# Patient Record
Sex: FEMALE | Race: WHITE | NOT HISPANIC OR LATINO | URBAN - METROPOLITAN AREA
[De-identification: names, ages, dates, MRNs, and addresses within clinical notes are randomized per-mention and may not be internally consistent; named-entity substitution may affect disease eponyms.]

---

## 2021-03-15 ENCOUNTER — APPOINTMENT (OUTPATIENT)
Dept: RADIOLOGY | Facility: MEDICAL CENTER | Age: 61
End: 2021-03-15
Attending: EMERGENCY MEDICINE
Payer: COMMERCIAL

## 2021-03-15 ENCOUNTER — HOSPITAL ENCOUNTER (EMERGENCY)
Facility: MEDICAL CENTER | Age: 61
End: 2021-03-15
Attending: EMERGENCY MEDICINE
Payer: COMMERCIAL

## 2021-03-15 VITALS
HEART RATE: 59 BPM | SYSTOLIC BLOOD PRESSURE: 133 MMHG | RESPIRATION RATE: 16 BRPM | OXYGEN SATURATION: 96 % | TEMPERATURE: 96.6 F | DIASTOLIC BLOOD PRESSURE: 71 MMHG | WEIGHT: 143.74 LBS | BODY MASS INDEX: 26.45 KG/M2 | HEIGHT: 62 IN

## 2021-03-15 DIAGNOSIS — R51.9 ACUTE NONINTRACTABLE HEADACHE, UNSPECIFIED HEADACHE TYPE: ICD-10-CM

## 2021-03-15 LAB
ALBUMIN SERPL BCP-MCNC: 4.6 G/DL (ref 3.2–4.9)
ALBUMIN/GLOB SERPL: 1.5 G/DL
ALP SERPL-CCNC: 94 U/L (ref 30–99)
ALT SERPL-CCNC: 16 U/L (ref 2–50)
ANION GAP SERPL CALC-SCNC: 11 MMOL/L (ref 7–16)
APTT PPP: 29.3 SEC (ref 24.7–36)
AST SERPL-CCNC: 12 U/L (ref 12–45)
BASOPHILS # BLD AUTO: 0.6 % (ref 0–1.8)
BASOPHILS # BLD: 0.03 K/UL (ref 0–0.12)
BILIRUB SERPL-MCNC: 0.5 MG/DL (ref 0.1–1.5)
BUN SERPL-MCNC: 18 MG/DL (ref 8–22)
CALCIUM SERPL-MCNC: 10.6 MG/DL (ref 8.5–10.5)
CHLORIDE SERPL-SCNC: 101 MMOL/L (ref 96–112)
CO2 SERPL-SCNC: 23 MMOL/L (ref 20–33)
CREAT SERPL-MCNC: 0.95 MG/DL (ref 0.5–1.4)
CRP SERPL HS-MCNC: 0.29 MG/DL (ref 0–0.75)
EOSINOPHIL # BLD AUTO: 0.16 K/UL (ref 0–0.51)
EOSINOPHIL NFR BLD: 3.1 % (ref 0–6.9)
ERYTHROCYTE [DISTWIDTH] IN BLOOD BY AUTOMATED COUNT: 50 FL (ref 35.9–50)
ERYTHROCYTE [SEDIMENTATION RATE] IN BLOOD BY WESTERGREN METHOD: 20 MM/HOUR (ref 0–30)
GLOBULIN SER CALC-MCNC: 3.1 G/DL (ref 1.9–3.5)
GLUCOSE SERPL-MCNC: 96 MG/DL (ref 65–99)
HCT VFR BLD AUTO: 42 % (ref 37–47)
HGB BLD-MCNC: 13.6 G/DL (ref 12–16)
IMM GRANULOCYTES # BLD AUTO: 0.02 K/UL (ref 0–0.11)
IMM GRANULOCYTES NFR BLD AUTO: 0.4 % (ref 0–0.9)
INR PPP: 0.88 (ref 0.87–1.13)
LYMPHOCYTES # BLD AUTO: 1.92 K/UL (ref 1–4.8)
LYMPHOCYTES NFR BLD: 36.9 % (ref 22–41)
MCH RBC QN AUTO: 31.9 PG (ref 27–33)
MCHC RBC AUTO-ENTMCNC: 32.4 G/DL (ref 33.6–35)
MCV RBC AUTO: 98.6 FL (ref 81.4–97.8)
MONOCYTES # BLD AUTO: 0.37 K/UL (ref 0–0.85)
MONOCYTES NFR BLD AUTO: 7.1 % (ref 0–13.4)
NEUTROPHILS # BLD AUTO: 2.7 K/UL (ref 2–7.15)
NEUTROPHILS NFR BLD: 51.9 % (ref 44–72)
NRBC # BLD AUTO: 0 K/UL
NRBC BLD-RTO: 0 /100 WBC
PLATELET # BLD AUTO: 201 K/UL (ref 164–446)
PMV BLD AUTO: 10.1 FL (ref 9–12.9)
POTASSIUM SERPL-SCNC: 3.9 MMOL/L (ref 3.6–5.5)
PROT SERPL-MCNC: 7.7 G/DL (ref 6–8.2)
PROTHROMBIN TIME: 12.3 SEC (ref 12–14.6)
RBC # BLD AUTO: 4.26 M/UL (ref 4.2–5.4)
SODIUM SERPL-SCNC: 135 MMOL/L (ref 135–145)
T4 FREE SERPL-MCNC: 1.29 NG/DL (ref 0.93–1.7)
TSH SERPL DL<=0.005 MIU/L-ACNC: 1.02 UIU/ML (ref 0.38–5.33)
WBC # BLD AUTO: 5.2 K/UL (ref 4.8–10.8)

## 2021-03-15 PROCEDURE — 99285 EMERGENCY DEPT VISIT HI MDM: CPT

## 2021-03-15 PROCEDURE — 36415 COLL VENOUS BLD VENIPUNCTURE: CPT

## 2021-03-15 PROCEDURE — 700105 HCHG RX REV CODE 258: Performed by: EMERGENCY MEDICINE

## 2021-03-15 PROCEDURE — 700117 HCHG RX CONTRAST REV CODE 255: Performed by: EMERGENCY MEDICINE

## 2021-03-15 PROCEDURE — 700111 HCHG RX REV CODE 636 W/ 250 OVERRIDE (IP): Performed by: EMERGENCY MEDICINE

## 2021-03-15 PROCEDURE — 86140 C-REACTIVE PROTEIN: CPT

## 2021-03-15 PROCEDURE — 80053 COMPREHEN METABOLIC PANEL: CPT

## 2021-03-15 PROCEDURE — 70498 CT ANGIOGRAPHY NECK: CPT

## 2021-03-15 PROCEDURE — 85025 COMPLETE CBC W/AUTO DIFF WBC: CPT

## 2021-03-15 PROCEDURE — 85730 THROMBOPLASTIN TIME PARTIAL: CPT

## 2021-03-15 PROCEDURE — 84443 ASSAY THYROID STIM HORMONE: CPT

## 2021-03-15 PROCEDURE — 70496 CT ANGIOGRAPHY HEAD: CPT

## 2021-03-15 PROCEDURE — 85652 RBC SED RATE AUTOMATED: CPT

## 2021-03-15 PROCEDURE — 85610 PROTHROMBIN TIME: CPT

## 2021-03-15 PROCEDURE — 84439 ASSAY OF FREE THYROXINE: CPT

## 2021-03-15 RX ORDER — SODIUM CHLORIDE, SODIUM LACTATE, POTASSIUM CHLORIDE, CALCIUM CHLORIDE 600; 310; 30; 20 MG/100ML; MG/100ML; MG/100ML; MG/100ML
1000 INJECTION, SOLUTION INTRAVENOUS ONCE
Status: COMPLETED | OUTPATIENT
Start: 2021-03-15 | End: 2021-03-15

## 2021-03-15 RX ORDER — LEVOTHYROXINE SODIUM 88 UG/1
88 TABLET ORAL
COMMUNITY

## 2021-03-15 RX ORDER — DIPHENHYDRAMINE HYDROCHLORIDE 50 MG/ML
12.5 INJECTION INTRAMUSCULAR; INTRAVENOUS ONCE
Status: COMPLETED | OUTPATIENT
Start: 2021-03-15 | End: 2021-03-15

## 2021-03-15 RX ORDER — METOCLOPRAMIDE HYDROCHLORIDE 5 MG/ML
10 INJECTION INTRAMUSCULAR; INTRAVENOUS ONCE
Status: COMPLETED | OUTPATIENT
Start: 2021-03-15 | End: 2021-03-15

## 2021-03-15 RX ADMIN — DIPHENHYDRAMINE HYDROCHLORIDE 12.5 MG: 50 INJECTION INTRAMUSCULAR; INTRAVENOUS at 12:54

## 2021-03-15 RX ADMIN — METOCLOPRAMIDE 10 MG: 5 INJECTION, SOLUTION INTRAMUSCULAR; INTRAVENOUS at 12:54

## 2021-03-15 RX ADMIN — SODIUM CHLORIDE, POTASSIUM CHLORIDE, SODIUM LACTATE AND CALCIUM CHLORIDE 1000 ML: 600; 310; 30; 20 INJECTION, SOLUTION INTRAVENOUS at 12:54

## 2021-03-15 RX ADMIN — IOHEXOL 80 ML: 350 INJECTION, SOLUTION INTRAVENOUS at 11:34

## 2021-03-15 ASSESSMENT — LIFESTYLE VARIABLES: DO YOU DRINK ALCOHOL: NO

## 2021-03-15 NOTE — ED NOTES
Visual acuity test performed, pt wearing Rx correctional lenses during test.      OS (Left Eye): 20/35   OD (Right Eye): 20/30    OU (Both Eyes): 20/25

## 2021-03-15 NOTE — ED TRIAGE NOTES
"61 y/o female ambulatory to triage with c/o headache \"sharp, knocking pain\" intermittently to the right side of the head along with visual changes.  Pt states she has several aneurysm clips and coils r/t three aneurysms that occurred 8 years ago.   "

## 2021-03-15 NOTE — ED PROVIDER NOTES
ED Provider Note    CHIEF COMPLAINT  Chief Complaint   Patient presents with   • Headache   • Visual Problems       HPI  Irasema Namita Jean-Baptiste is a 60 y.o. female who presents right-sided headache starting this morning at around 6 AM.  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  No associated vomiting.  Has had associated faint blurry vision to the right eye over the past week or so which has been intermittent.  The patient has extensive intracranial history including aneurysm status post coiling about 9 years ago and clipping 3 years ago at [facility].  Denies any numbness or weakness.  Has baseline poor balance.  She states that she typically does not get headaches despite her aneurysm history.  No known history of migraines.  She states that the pain is maximal in the right temporal region.  No jaw pain or claudication.    REVIEW OF SYSTEMS  See HPI for further details. All other systems are negative.     PAST MEDICAL HISTORY   has a past medical history of Aneurysm (HCC), Cancer (HCC) (), Graves' disease with exophthalmos, and Indigestion.    SOCIAL HISTORY  Social History     Tobacco Use   • Smoking status: Former Smoker     Quit date: 3/15/2013     Years since quittin.0   • Smokeless tobacco: Never Used   • Tobacco comment: 1/2 pack day for 30 yrs   Substance and Sexual Activity   • Alcohol use: Yes     Comment: occassional   • Drug use: Yes     Comment: smokes marijuana occ   • Sexual activity: Not on file       SURGICAL HISTORY   has a past surgical history that includes partial hemispherectomy (); craniotomy aneurysm; and other neurological surg.    CURRENT MEDICATIONS  Home Medications     Reviewed by Enrique Uribe R.N. (Registered Nurse) on 03/15/21 at 0855  Med List Status: Partial   Medication Last Dose Status   aspirin EC (ECOTRIN) 81 MG TBEC not taking Active   atorvastatin (LIPITOR) 20 MG TABS not taking Active   carvedilol (COREG) 6.25 MG TABS not taking Active   IBUPROFEN not  "taking Active   levothyroxine (SYNTHROID) 88 MCG Tab  Active   oxycodone-acetaminophen (PERCOCET) 5-325 MG TABS not taking Active                ALLERGIES  Allergies   Allergen Reactions   • Penicillins Shortness of Breath       PHYSICAL EXAM  VITAL SIGNS: /89   Pulse 89   Temp 36.1 °C (97 °F) (Temporal)   Resp 14   Ht 1.575 m (5' 2\")   Wt 65.2 kg (143 lb 11.8 oz)   SpO2 92%   BMI 26.29 kg/m²   Pulse ox interpretation: I interpret this pulse ox as normal.  Constitutional: Alert in no apparent distress.  HENT: No signs of trauma, Bilateral external ears normal, Nose normal.   Eyes: Pupils are equal and reactive, Conjunctiva normal, Non-icteric.   Neck: Normal range of motion, No tenderness, Supple, No stridor.   Cardiovascular: Regular rate and rhythm.   Thorax & Lungs: Normal breath sounds, No respiratory distress, No wheezing, No chest tenderness.   Abdomen: Bowel sounds normal, Soft, No tenderness, No masses, No pulsatile masses. No peritoneal signs.  Skin: Warm, Dry, No erythema, No rash.   Back: No bony tenderness, No CVA tenderness.   Extremities: Intact distal pulses, No edema, No tenderness, No cyanosis  Musculoskeletal: Good range of motion in all major joints. No tenderness to palpation or major deformities noted.   Neurologic: Alert, cranial nerves II through XII grossly intact, normal motor function and gait, Normal sensory function, No focal deficits noted.       DIAGNOSTIC STUDIES / PROCEDURES    EKG - Physician interpretation  No results found for this or any previous visit.      LABS  Labs Reviewed   CBC WITH DIFFERENTIAL - Abnormal; Notable for the following components:       Result Value    MCV 98.6 (*)     MCHC 32.4 (*)     All other components within normal limits    Narrative:     Indicate which anticoagulants the patient is on:->NONE   COMP METABOLIC PANEL - Abnormal; Notable for the following components:    Calcium 10.6 (*)     All other components within normal limits    " Narrative:     Indicate which anticoagulants the patient is on:->NONE   PROTHROMBIN TIME    Narrative:     Indicate which anticoagulants the patient is on:->NONE   APTT    Narrative:     Indicate which anticoagulants the patient is on:->NONE   SED RATE    Narrative:     Indicate which anticoagulants the patient is on:->NONE   CRP QUANTITIVE (NON-CARDIAC)    Narrative:     Indicate which anticoagulants the patient is on:->NONE   ESTIMATED GFR    Narrative:     Indicate which anticoagulants the patient is on:->NONE   TSH   FREE THYROXINE         RADIOLOGY  CT-CTA HEAD WITH & W/O-POST PROCESS   Final Result      1.  No acute intracranial abnormality.   2.  Bilateral aneurysm coiling/clipping as detailed above. No definite residual/recurrent aneurysm given limitation of artifact.   3.  No large vessel occlusion.      CT-CTA NECK WITH & W/O-POST PROCESSING   Final Result      1.  Metallic wire within the right internal carotid artery lumen from distal right ICA aneurysm coil mass.   2.  Mild atherosclerosis of the carotid bifurcations.   3.  No stenosis of the neck arteries by NASCET criteria.   4.  9 mm left upper lobe noncalcified pulmonary nodule. Follow-up/further evaluation as detailed.      Small pulmonary nodules are an extremely common finding on CT, and even in smokers, the vast majority of these nodules are benign.  For this reason, we recommend managing such nodules with follow-up CT.      Single solid nodules measuring greater than 8 mm in all patients, consider CT, PET/CT, or tissue sampling at three months.      Fleischner Society 2017 Guidelines for Management of Incidentally Detected Pulmonary Nodules on CT Images            COURSE & MEDICAL DECISION MAKING    Medications   iohexol (OMNIPAQUE) 350 mg/mL (80 mL Intravenous Given 3/15/21 1134)   lactated ringers infusion (BOLUS) (0 mL Intravenous Stopped 3/15/21 1406)   metoclopramide (REGLAN) injection 10 mg (10 mg Intravenous Given 3/15/21 1254)    diphenhydrAMINE (BENADRYL) injection 12.5 mg (12.5 mg Intravenous Given 3/15/21 1254)       Pertinent Labs & Imaging studies reviewed. (See chart for details)  60 y.o. female presenting with right-sided headache and intermittent blurry vision.  Has an extensive cerebral aneurysmal history including multiple brain aneurysms and coiling followed by clipping of vessels in the brain on the right side.  No vomiting.  No numbness or weakness or focal neurologic deficits except for the intermittent blurry vision but she has rather good eyesight here in the emergency department.  The patient does wear corrective lenses.  No skin changes or history of trauma.    CT of the head and neck with contrast was obtained within 6 hours of onset of headache.  Headache was not described as thunderclap/sudden on onset.  Has slight right-sided neck pain.  Given the unusual nature of the patient's headache for this patient along with extensive prior intracranial history, CT was ordered and found to be unremarkable for acute abnormalities.  Patient was treated for headache with typical migraine therapy including IV fluid hydration, Reglan, Benadryl.  Upon reevaluation, she reports feeling much improved and back to baseline.    HYDRATION: Based on the patient's presentation of headache the patient was given IV fluids for typical migraine therapy. IV Hydration was used because oral hydration was not as rapid as required. Upon recheck following hydration, the patient was improved.  The patient also had ESR and CRP performed that were unremarkable.  Low suspicion for temporal arteritis.  Laboratory studies are overall unremarkable and noncontributory to the patient's presenting symptoms.    All results were reviewed with patient and was recommended that she follow-up with a primary care physician for further management.  She may require follow-up with a local neurologist as well.    The patient was instructed to follow-up with primary care  "physician for further management.  To return immediately for any worsening symptoms or development of any other concerning signs or symptoms. The patient verbalizes understanding in their own words.    /71   Pulse (!) 59   Temp 35.9 °C (96.6 °F)   Resp 16   Ht 1.575 m (5' 2\")   Wt 65.2 kg (143 lb 11.8 oz)   SpO2 96%   BMI 26.29 kg/m²     The patient was referred to primary care where they will receive further BP management.      [facility], Emergency Dept  [address] [address]  [phone]    As needed, If symptoms worsen    [facility], Emergency Dept  [address] [address] [phone]        Primary care doctor    Schedule an appointment as soon as possible for a visit       [facility] - Neurology  [address], [address]  [facility] [address]  [phone]  Schedule an appointment as soon as possible for a visit         FINAL IMPRESSION  1. Acute nonintractable headache, unspecified headache type            Electronically signed by: Yariel Delgado M.D., 3/15/2021 9:32 AM    "

## 2025-04-29 ENCOUNTER — APPOINTMENT (OUTPATIENT)
Dept: RADIOLOGY | Facility: MEDICAL CENTER | Age: 65
End: 2025-04-29
Attending: EMERGENCY MEDICINE
Payer: MEDICARE

## 2025-04-29 ENCOUNTER — HOSPITAL ENCOUNTER (EMERGENCY)
Facility: MEDICAL CENTER | Age: 65
End: 2025-04-29
Attending: EMERGENCY MEDICINE
Payer: MEDICARE

## 2025-04-29 VITALS
BODY MASS INDEX: 26.68 KG/M2 | RESPIRATION RATE: 19 BRPM | OXYGEN SATURATION: 90 % | HEART RATE: 68 BPM | WEIGHT: 145 LBS | HEIGHT: 62 IN | TEMPERATURE: 97.9 F | DIASTOLIC BLOOD PRESSURE: 66 MMHG | SYSTOLIC BLOOD PRESSURE: 127 MMHG

## 2025-04-29 DIAGNOSIS — R07.9 CHEST PAIN, UNSPECIFIED TYPE: Primary | ICD-10-CM

## 2025-04-29 DIAGNOSIS — Z86.79: ICD-10-CM

## 2025-04-29 DIAGNOSIS — R91.1 LUNG NODULE: ICD-10-CM

## 2025-04-29 DIAGNOSIS — G43.809 OTHER MIGRAINE WITHOUT STATUS MIGRAINOSUS, NOT INTRACTABLE: ICD-10-CM

## 2025-04-29 LAB
ALBUMIN SERPL BCP-MCNC: 4.3 G/DL (ref 3.2–4.9)
ALBUMIN/GLOB SERPL: 1.4 G/DL
ALP SERPL-CCNC: 106 U/L (ref 30–99)
ALT SERPL-CCNC: 22 U/L (ref 2–50)
ANION GAP SERPL CALC-SCNC: 11 MMOL/L (ref 7–16)
AST SERPL-CCNC: 22 U/L (ref 12–45)
BASOPHILS # BLD AUTO: 0.9 % (ref 0–1.8)
BASOPHILS # BLD: 0.05 K/UL (ref 0–0.12)
BILIRUB SERPL-MCNC: 0.4 MG/DL (ref 0.1–1.5)
BUN SERPL-MCNC: 20 MG/DL (ref 8–22)
CALCIUM ALBUM COR SERPL-MCNC: 9.5 MG/DL (ref 8.5–10.5)
CALCIUM SERPL-MCNC: 9.7 MG/DL (ref 8.5–10.5)
CHLORIDE SERPL-SCNC: 107 MMOL/L (ref 96–112)
CO2 SERPL-SCNC: 24 MMOL/L (ref 20–33)
CREAT SERPL-MCNC: 0.93 MG/DL (ref 0.5–1.4)
EKG IMPRESSION: NORMAL
EKG IMPRESSION: NORMAL
EOSINOPHIL # BLD AUTO: 0.2 K/UL (ref 0–0.51)
EOSINOPHIL NFR BLD: 3.5 % (ref 0–6.9)
ERYTHROCYTE [DISTWIDTH] IN BLOOD BY AUTOMATED COUNT: 45.7 FL (ref 35.9–50)
GFR SERPLBLD CREATININE-BSD FMLA CKD-EPI: 68 ML/MIN/1.73 M 2
GLOBULIN SER CALC-MCNC: 3.1 G/DL (ref 1.9–3.5)
GLUCOSE SERPL-MCNC: 93 MG/DL (ref 65–99)
HCT VFR BLD AUTO: 41.4 % (ref 37–47)
HGB BLD-MCNC: 14 G/DL (ref 12–16)
LYMPHOCYTES # BLD AUTO: 2.23 K/UL (ref 1–4.8)
LYMPHOCYTES NFR BLD: 39.1 % (ref 22–41)
MANUAL DIFF BLD: NORMAL
MCH RBC QN AUTO: 32.6 PG (ref 27–33)
MCHC RBC AUTO-ENTMCNC: 33.8 G/DL (ref 32.2–35.5)
MCV RBC AUTO: 96.3 FL (ref 81.4–97.8)
MONOCYTES # BLD AUTO: 0.2 K/UL (ref 0–0.85)
MONOCYTES NFR BLD AUTO: 3.5 % (ref 0–13.4)
MORPHOLOGY BLD-IMP: NORMAL
NEUTROPHILS # BLD AUTO: 3.02 K/UL (ref 1.82–7.42)
NEUTROPHILS NFR BLD: 53 % (ref 44–72)
NRBC # BLD AUTO: 0 K/UL
NRBC BLD-RTO: 0 /100 WBC (ref 0–0.2)
NT-PROBNP SERPL IA-MCNC: <36 PG/ML (ref 0–125)
PLATELET # BLD AUTO: 188 K/UL (ref 164–446)
PLATELET BLD QL SMEAR: NORMAL
PMV BLD AUTO: 9.6 FL (ref 9–12.9)
POTASSIUM SERPL-SCNC: 3.8 MMOL/L (ref 3.6–5.5)
PROT SERPL-MCNC: 7.4 G/DL (ref 6–8.2)
RBC # BLD AUTO: 4.3 M/UL (ref 4.2–5.4)
RBC BLD AUTO: NORMAL
SODIUM SERPL-SCNC: 142 MMOL/L (ref 135–145)
TROPONIN T SERPL-MCNC: <6 NG/L (ref 6–19)
TROPONIN T SERPL-MCNC: <6 NG/L (ref 6–19)
WBC # BLD AUTO: 5.7 K/UL (ref 4.8–10.8)

## 2025-04-29 PROCEDURE — 700117 HCHG RX CONTRAST REV CODE 255: Performed by: EMERGENCY MEDICINE

## 2025-04-29 PROCEDURE — 85027 COMPLETE CBC AUTOMATED: CPT

## 2025-04-29 PROCEDURE — 85007 BL SMEAR W/DIFF WBC COUNT: CPT

## 2025-04-29 PROCEDURE — 83880 ASSAY OF NATRIURETIC PEPTIDE: CPT

## 2025-04-29 PROCEDURE — 93005 ELECTROCARDIOGRAM TRACING: CPT | Mod: TC

## 2025-04-29 PROCEDURE — 80053 COMPREHEN METABOLIC PANEL: CPT

## 2025-04-29 PROCEDURE — 70450 CT HEAD/BRAIN W/O DYE: CPT

## 2025-04-29 PROCEDURE — 71045 X-RAY EXAM CHEST 1 VIEW: CPT

## 2025-04-29 PROCEDURE — 71260 CT THORAX DX C+: CPT

## 2025-04-29 PROCEDURE — 84484 ASSAY OF TROPONIN QUANT: CPT | Mod: 91

## 2025-04-29 PROCEDURE — 36415 COLL VENOUS BLD VENIPUNCTURE: CPT

## 2025-04-29 PROCEDURE — 99285 EMERGENCY DEPT VISIT HI MDM: CPT

## 2025-04-29 PROCEDURE — 93005 ELECTROCARDIOGRAM TRACING: CPT | Mod: TC | Performed by: EMERGENCY MEDICINE

## 2025-04-29 RX ADMIN — IOHEXOL 96 ML: 350 INJECTION, SOLUTION INTRAVENOUS at 17:15

## 2025-04-29 NOTE — ED TRIAGE NOTES
Chief Complaint   Patient presents with    Chest Pain     Pt reports around 0700 started having minor CP, radiating up neck resulting in HA     Pt ambulatory to triage for above complaint.      Pt is alert & oriented and follows commands. Pt speaking in full sentences and responds appropriately to questions. No acute distress noted in triage. Respirations are even and unlabored. Skin is pink/warm/dry.    Pt placed back in lobby and educated on triage process. Pt encouraged to alert staff to any changes in condition.

## 2025-04-29 NOTE — ED PROVIDER NOTES
ED Provider Note    Scribed for Vu Barrientos by Chana Bacon. 4/29/2025  2:46 PM    Primary care provider: Pcp Pt States None  Means of arrival: Brought in by   History obtained from: Patient  History limited by: None    CHIEF COMPLAINT  Chief Complaint   Patient presents with    Chest Pain     Pt reports around 0700 started having minor CP, radiating up neck resulting in HA     EXTERNAL RECORDS REVIEWED  Other The patient was seen here in March of 2021 for a headache and vision problems.     HPI/ROS  LIMITATION TO HISTORY   Select: : None  OUTSIDE HISTORIAN(S):  Significant other  at bedside to confirm sequence of events and collateral information provided. See HPI below.     HPI  Irasema Jean-Baptiste is a 65 y.o. female who presents to the Emergency Department for evaluation of chest pain onset 7 AM this morning ago. She describes she didn't wake up with chest pain, but notes she started having chest pressure when she got to work. The patient reports that her chest pain radiated up to her neck. She states she was not exerting herself at that time. The patient notes that the patient lasted for about an hour. The patient states that once her chest pain was gone, she developed a headache, which is what prompted her to come in to the emergency department. The patient reports that she has had an aneurysm in the past, with a coil in the back of her neck and two clips placed. She denies having any shortness of breath while she was having chest pain. The patient states she also has back soreness, which she notes is from pushing a heavier individual up a hill yesterday. She states she took Tylenol and Motrin for this pain this morning. The patient does mention that she has history of a myocardial infarction in 2009 and notes that no intervention were done at that time. The patient reports she is meant to take a statin, but doesn't take it. The patient only takes her thyroid medication. The  patient denies the use of tobacco, drugs or alcohol.     REVIEW OF SYSTEMS  As above, all other systems reviewed and are negative.   See HPI for further details.     PAST MEDICAL HISTORY   has a past medical history of Aneurysm (HCC), Cancer (HCC) (), Graves' disease with exophthalmos, Indigestion, MI (myocardial infarction) (HCC), and Stroke (HCC).    SURGICAL HISTORY   has a past surgical history that includes partial hemispherectomy (); craniotomy aneurysm; and other neurological surg.    SOCIAL HISTORY  Social History     Tobacco Use    Smoking status: Former     Current packs/day: 0.00     Types: Cigarettes     Quit date: 3/15/2013     Years since quittin.1    Smokeless tobacco: Never    Tobacco comments:     1/2 pack day for 30 yrs   Vaping Use    Vaping status: Never Used   Substance Use Topics    Alcohol use: Not Currently     Comment: occassional    Drug use: Not Currently     Comment: smokes marijuana occ      Social History     Substance and Sexual Activity   Drug Use Not Currently    Comment: smokes marijuana occ     FAMILY HISTORY  History reviewed. No pertinent family history.    CURRENT MEDICATIONS  Current Outpatient Medications   Medication Instructions    aspirin EC (ECOTRIN) 81 mg, DAILY    atorvastatin (LIPITOR) 20 mg, NIGHTLY    carvedilol (COREG) 6.25 mg, 2 TIMES DAILY WITH MEALS    IBUPROFEN 200 mg    levothyroxine (SYNTHROID) 88 mcg, Oral, EACH MORNING ON EMPTY STOMACH    oxycodone-acetaminophen (PERCOCET) 5-325 MG TABS EVERY 4 HOURS PRN      ALLERGIES  Allergies   Allergen Reactions    Penicillins Shortness of Breath       PHYSICAL EXAM    VITAL SIGNS:   Vitals:    25 1530 25 1600 25 1630 25 1700   BP: 114/70 117/69 (!) 143/69 127/66   Pulse: 73 70 71 68   Resp: 16 17 16 19   Temp:    36.6 °C (97.9 °F)   TempSrc:    Temporal   SpO2: 93% 91% 92% 90%   Weight:       Height:         Vitals: My interpretation: normotensive, not tachycardic, afebrile, not  hypoxic    Reinterpretation of vitals: Unchanged unremarkable    Cardiac Monitor Interpretation: The cardiac monitor revealed normal Sinus Rhythm  as interpreted by me. The cardiac monitor was ordered secondary to the patient's history of chest pain and to monitor for dysrhythmia and/or tachycardia.    PE:   Gen: sitting comfortably, speaking clearly, appears in no acute distress   ENT: Mucous membranes moist, posterior pharynx clear, uvula midline, nares patent bilaterally   Neck: Supple, FROM  Pulmonary: Lungs are clear to auscultation bilaterally. No tachypnea  CV:  RRR, no murmur appreciated, pulses 2+ in both upper and lower extremities  Abdomen: soft, NT/ND; no rebound/guarding  : no CVA or suprapubic tenderness   Neuro: A&Ox4 (person, place, time, situation), speech fluent, gait steady, no focal deficits appreciated  Skin: No rash or lesions.  No pallor or jaundice.  No cyanosis.  Warm and dry.     DIAGNOSTIC STUDIES / PROCEDURES    LABS  Results for orders placed or performed during the hospital encounter of 25   EKG    Collection Time: 25  1:40 PM   Result Value Ref Range    Report       [facility] Emergency Dept.    Test Date:  2025  Pt Name:    FINA DINERO                 Department: ER  MRN:        [id number]                      Room:  Gender:     Female                       Technician: [id number]  :        1960                   Requested By:ER TRIAGE PROTOCOL  Order #:    [id number]                    Reading MD:    Measurements  Intervals                                Axis  Rate:       85                           P:          71  FL:         137                          QRS:        55  QRSD:       91                           T:          66  QT:         377  QTc:        449    Interpretive Statements  Sinus rhythm  Multiple ventricular premature complexes  Minimal ST depression, inferior leads  Compared to ECG 2009 21:49:27  Ventricular premature  complex(es) now present  ST (T wave) deviation now present  Sinus tachycardia no longer present  T-wave abnormality no longer present     CBC with Differential    Collection Time: 04/29/25  1:59 PM   Result Value Ref Range    WBC 5.7 4.8 - 10.8 K/uL    RBC 4.30 4.20 - 5.40 M/uL    Hemoglobin 14.0 12.0 - 16.0 g/dL    Hematocrit 41.4 37.0 - 47.0 %    MCV 96.3 81.4 - 97.8 fL    MCH 32.6 27.0 - 33.0 pg    MCHC 33.8 32.2 - 35.5 g/dL    RDW 45.7 35.9 - 50.0 fL    Platelet Count 188 164 - 446 K/uL    MPV 9.6 9.0 - 12.9 fL    Neutrophils-Polys 53.00 44.00 - 72.00 %    Lymphocytes 39.10 22.00 - 41.00 %    Monocytes 3.50 0.00 - 13.40 %    Eosinophils 3.50 0.00 - 6.90 %    Basophils 0.90 0.00 - 1.80 %    Nucleated RBC 0.00 0.00 - 0.20 /100 WBC    Neutrophils (Absolute) 3.02 1.82 - 7.42 K/uL    Lymphs (Absolute) 2.23 1.00 - 4.80 K/uL    Monos (Absolute) 0.20 0.00 - 0.85 K/uL    Eos (Absolute) 0.20 0.00 - 0.51 K/uL    Baso (Absolute) 0.05 0.00 - 0.12 K/uL    NRBC (Absolute) 0.00 K/uL   Complete Metabolic Panel (CMP)    Collection Time: 04/29/25  1:59 PM   Result Value Ref Range    Sodium 142 135 - 145 mmol/L    Potassium 3.8 3.6 - 5.5 mmol/L    Chloride 107 96 - 112 mmol/L    Co2 24 20 - 33 mmol/L    Anion Gap 11.0 7.0 - 16.0    Glucose 93 65 - 99 mg/dL    Bun 20 8 - 22 mg/dL    Creatinine 0.93 0.50 - 1.40 mg/dL    Calcium 9.7 8.5 - 10.5 mg/dL    Correct Calcium 9.5 8.5 - 10.5 mg/dL    AST(SGOT) 22 12 - 45 U/L    ALT(SGPT) 22 2 - 50 U/L    Alkaline Phosphatase 106 (H) 30 - 99 U/L    Total Bilirubin 0.4 0.1 - 1.5 mg/dL    Albumin 4.3 3.2 - 4.9 g/dL    Total Protein 7.4 6.0 - 8.2 g/dL    Globulin 3.1 1.9 - 3.5 g/dL    A-G Ratio 1.4 g/dL   proBrain Natriuretic Peptide, NT (BNP)    Collection Time: 04/29/25  1:59 PM   Result Value Ref Range    NT-proBNP <36 0 - 125 pg/mL   Troponins NOW    Collection Time: 04/29/25  1:59 PM   Result Value Ref Range    Troponin T <6 6 - 19 ng/L   ESTIMATED GFR    Collection Time: 04/29/25  1:59 PM    Result Value Ref Range    GFR (CKD-EPI) 68 >60 mL/min/1.73 m 2   DIFFERENTIAL MANUAL    Collection Time: 25  1:59 PM   Result Value Ref Range    Manual Diff Status PERFORMED    PERIPHERAL SMEAR REVIEW    Collection Time: 25  1:59 PM   Result Value Ref Range    Peripheral Smear Review see below    PLATELET ESTIMATE    Collection Time: 25  1:59 PM   Result Value Ref Range    Plt Estimation Normal    MORPHOLOGY    Collection Time: 25  1:59 PM   Result Value Ref Range    RBC Morphology Normal    EKG    Collection Time: 25  2:38 PM   Result Value Ref Range    Report       [facility] Emergency Dept.    Test Date:  2025  Pt Name:    FINA DINERO                 Department: ER  MRN:        [id number]                      Room:  Gender:     Female                       Technician: [id number]  :        1960                   Requested By:ER TRIAGE PROTOCOL  Order #:    [id number]                    Reading MD: Vu Barrientos    Measurements  Intervals                                Axis  Rate:       85                           P:          71  [place]:         137                          QRS:        55  QRSD:       91                           T:          66  QT:         377  QTc:        449    Interpretive Statements  Sinus rhythm  Multiple ventricular premature complexes  Minimal ST depression, inferior leads  Compared to ECG 2009 21:49:27  Ventricular premature complex(es) now present  ST (T wave) deviation now present  Sinus tachycardia no longer present  T-wave abnormality no longer present  Electronically Signed On 2025 14:38:12 PDT by Vu Barrientos     Troponins in two (2) hours    Collection Time: 25  5:11 PM   Result Value Ref Range    Troponin T <6 6 - 19 ng/L      All labs reviewed by me. Labs were compared to prior labs if they were available. Significant for no leukocytosis, no anemia, normal electrolytes, normal glucose, normal renal  function, normal liver enzymes, normal bilirubin, troponin negative, BNP negative.    RADIOLOGY  I have independently interpreted the diagnostic imaging associated with this visit and am waiting the final reading from the radiologist.   My preliminary interpretation is a follows: No infiltrate concerning for infection on my independent interpretation  Radiologist interpretation is as follows:  CT-CHEST (THORAX) WITH   Final Result      1.  9 mm anterior pleural-based left upper lobe nodule.   2.  3 mm left apical pulmonary nodule.   3.  Atherosclerosis with coronary artery disease.      Fleischner Society pulmonary nodule recommendations:   Low Risk: CT at 3-6 months, then consider CT at 18-24 months      High Risk: CT at 3-6 months, then at 18-24 months      Comments: Use most suspicious nodule as guide to management. Follow-up intervals may vary according to size and risk.      Low Risk - Minimal or absent history of smoking and of other known risk factors.      High Risk - History of smoking or of other known risk factors.      Note: These recommendations do not apply to lung cancer screening, patients with immunosuppression, or patients with known primary cancer.      Fleischner Society 2017 Guidelines for Management of Incidentally Detected Pulmonary Nodules in Adults            CT-HEAD W/O   Final Result      1.  No acute intracranial abnormality.   2.  Postsurgical changes consistent with left craniotomy and bilateral aneurysm coiling/clipping.                  DX-CHEST-PORTABLE (1 VIEW)   Final Result      1.  Left midlung nodule measuring 1.5 cm in diameter. Further evaluation with CT of the chest with IV contrast is recommended.   2.  Bibasilar linear parenchymal scarring.   3.  Aortic atherosclerosis.        COURSE & MEDICAL DECISION MAKING  Nursing notes, VS, PMSFHx, labs, imaging, EKG reviewed in chart.    Heart Score: Low    ED Observation Status? No; Patient does not meet criteria for ED Observation.      Ddx: PE, MI, pneumonia, subarachnoid hemorrhage, ruptured aneurysm    MDM: 2:46 PM Irasema Jean-Baptiste is a 65 y.o. female who presented with evaluation for some mild chest pain that started around 7 AM when she was at work, nonexertional, nonpleuritic, nonradiating.  Physical pressure bilateral in the chest.  Radiated up her neck she developed a headache later that lasted for about an hour, stated at 10:30 AM.  She has a history of multiple brain aneurysms with stenting and coils in place.  States that headache was moderate but not the worst headache of her life, did come on suddenly and then did improve.  She is now completely symptom-free.  She is 4 hours out from onset of headache on my evaluation.  She denies any neurological symptoms or concerns at this time and is chest pain free and has no complaints.  Her vital signs are reassuring.  Physical neurological exam is within normal limits.  Will initiate workup for chest pain as well as CT imaging of the head for concerns of possible ruptured aneurysm.  Thankfully her EKG shows no ischemic changes with normal sinus rhythm.  Her chest x-ray shows no obvious infection or infiltrate but radiologist does, there is a 1.5 cm midlung nodule and recommends further CT imaging with contrast which was ordered along with CT imaging of the head.   All labs reviewed by me. Labs were compared to prior labs if they were available. Significant for no leukocytosis, no anemia, normal electrolytes, normal glucose, normal renal function, normal liver enzymes, normal bilirubin, troponin negative, BNP negative.  CT scan of the patient's head was unremarkable.  CT scan of the chest showed some pulmonary nodules that will be followed up as an outpatient with primary care per protocol.  Overall her headache is resolved on reevaluation.  Laboratory evaluation, physical exam and vital signs are reassuring.  After shared decision-making with the patient and her , they verbalized  understanding strict return precautions and plan for discharge home.  They are amenable to this plan and feel reassured.    ADDITIONAL PROBLEM LIST AND DISPOSITION    I have discussed management of the patient with the following physicians and CHELA's: None    Discussion of management with other QHP or appropriate source(s): None     Escalation of care considered, and ultimately not performed:acute inpatient care management, however at this time, the patient is most appropriate for outpatient management    Barriers to care at this time, including but not limited to: Patient does not have established PCP.     Decision tools and prescription drugs considered including, but not limited to: Pain Medications Tylenol Motrin .    FINAL IMPRESSION  1. Chest pain, unspecified type Acute   2. Other migraine without status migrainosus, not intractable Acute   3. History of ruptured cerebral aneurysm Acute   4. Lung nodule Acute      Chana NEWBY (Scribe), am scribing for, and in the presence of, Vu Barrientos.    Electronically signed by: Chana Bacon (Scribe), 4/29/2025    I, Vu Barrientos personally performed the services described in this documentation, as scribed by Chana Bacon in my presence, and it is both accurate and complete.    The note accurately reflects work and decisions made by me.  Vu Barrientos  4/29/2025  3:09 PM

## 2025-04-30 NOTE — ED NOTES
Discharge teaching and paperwork provided and all questions/concerns answered. VSS, assessment stable and PIV removed. Patient discharged to the care of self/family and ambulated out of the ED.